# Patient Record
Sex: FEMALE | URBAN - METROPOLITAN AREA
[De-identification: names, ages, dates, MRNs, and addresses within clinical notes are randomized per-mention and may not be internally consistent; named-entity substitution may affect disease eponyms.]

---

## 2021-02-24 DIAGNOSIS — Z23 NEED FOR VACCINATION: ICD-10-CM

## 2021-03-12 ENCOUNTER — IMMUNIZATION (OUTPATIENT)
Dept: FAMILY PLANNING/WOMEN'S HEALTH CLINIC | Facility: IMMUNIZATION CENTER | Age: 81
End: 2021-03-12
Attending: INTERNAL MEDICINE
Payer: COMMERCIAL

## 2021-03-12 DIAGNOSIS — Z23 NEED FOR VACCINATION: ICD-10-CM

## 2021-03-12 DIAGNOSIS — Z23 ENCOUNTER FOR VACCINATION: Primary | ICD-10-CM

## 2021-03-12 PROCEDURE — 91301 MODERNA SARS-COV-2 VACCINE: CPT

## 2021-03-12 PROCEDURE — 0011A MODERNA SARS-COV-2 VACCINE: CPT

## 2021-03-15 ENCOUNTER — NURSE TRIAGE (OUTPATIENT)
Dept: HEALTH INFORMATION MANAGEMENT | Facility: OTHER | Age: 81
End: 2021-03-15

## 2021-03-29 ENCOUNTER — NURSE TRIAGE (OUTPATIENT)
Dept: HEALTH INFORMATION MANAGEMENT | Facility: OTHER | Age: 81
End: 2021-03-29

## 2021-03-29 NOTE — TELEPHONE ENCOUNTER
"Pt concerned about getting second dose of the COVID vaccine.  Pt advised by PCP to take benadryl 30 minutes prior to vaccinations.    Pt calling for validation for her concerns.  Advised to take Benadryl as told by PCP, and to notify vaccine administration site about her adverse reaction to the first dose.         Additional Information  • Negative: Difficulty with breathing or swallowing starts within 2 hours after injection  • Negative: Difficult to awaken or acting confused (e.g., disoriented, slurred speech)  • Negative: Unresponsive, passed out, or very weak  • Negative: Sounds like a life-threatening emergency to the triager  • Negative: Sounds like a severe, unusual reaction to the triager  • Negative: Fever > 103 F (39.4 C)  • Negative: Fever > 101 F (38.3 C) and over 60 years of age  • Negative: Fever > 100.0 F (37.8 C) and has diabetes mellitus or a weak immune system (e.g., HIV positive, cancer chemotherapy, organ transplant, splenectomy, chronic steroids)  • Negative: Fever > 100.0 F (37.8 C) and bedridden (e.g., nursing home patient, stroke, chronic illness, recovering from surgery)  • Negative: Measles vaccine and purple/blood-colored rash (onset day 6-12)  • Negative: Redness or red streak around the injection site begins > 48 hours after shot  • Negative: Fever present > 3 days (72 hours)  • Negative: Smallpox vaccine and eye pain, eye redness, or rash on eyelids  • Negative: Deep lump follows (in 2 to 8 weeks) Td or TDaP shot, and becomes tender to the touch  • Negative: Patient wants to be seen  • Negative: Mild immunization reaction  • Negative: Painless lump at Tetanus-diphtheria (Td) injection site  • Negative: Immunization reactions, questions about    Answer Assessment - Initial Assessment Questions  1. SYMPTOMS: \"What is the main symptom?\" (e.g., redness, swelling, pain)       Dizziness   2. ONSET: \"When was the vaccine (shot) given?\" \"How much later did the immediately begin?\" (e.g., hours, " "days ago)       Swollen eyes/lips happen 1 hour.   3. SEVERITY: \"How bad is it?\"       Bad  4. FEVER: \"Is there a fever?\" If so, ask: \"What is it, how was it measured, and when did it start?\"       No  5. IMMUNIZATIONS GIVEN: \"What shots have you recently received?\"      Covid  6. PAST REACTIONS: \"Have you reacted to immunizations before?\" If so, ask: \"What happened?\"      No  7. OTHER SYMPTOMS: \"Do you have any other symptoms?\"      Not now.  Just concerned about the second dose.    Protocols used: IMMUNIZATION [id number]      "

## 2021-04-17 ENCOUNTER — IMMUNIZATION (OUTPATIENT)
Dept: FAMILY PLANNING/WOMEN'S HEALTH CLINIC | Facility: IMMUNIZATION CENTER | Age: 81
End: 2021-04-17
Attending: INTERNAL MEDICINE
Payer: COMMERCIAL

## 2021-04-17 DIAGNOSIS — Z23 ENCOUNTER FOR VACCINATION: Primary | ICD-10-CM

## 2021-04-17 PROCEDURE — 0012A MODERNA SARS-COV-2 VACCINE: CPT

## 2021-04-17 PROCEDURE — 91301 MODERNA SARS-COV-2 VACCINE: CPT

## 2022-06-01 NOTE — TELEPHONE ENCOUNTER
Clinic hours for Dr. Greenfield:  Monday 8:20am - 4:30pm  Tuesday 8:20am - 4:30pm  Wednesday 8:20am - 4:30pm  Thursday 8:20am - 4:30pm  Friday           Not in    If you need a refill on your prescription, please call your pharmacy and let them know. Please be proactive and call before your medication runs out. The pharmacy will then contact us for the refill. Please allow 24-48 hours for the refill to be processed.     If your Physician/Specialty Provider has ordered additional laboratory or radiology testing to be done before your next scheduled office visit, those results will be discussed with you at that upcoming visit. This will allow you the opportunity to go over the results in person with your provider. If your results require immediate intervention, you will be contacted sooner by phone call.     If your Physician/Specialty Provider has ordered labs for you to be done either today during this office visit, or in between office visits, you may receive any non-urgent test results and recommendations via your [facility]. To retrieve these results and recommendations, please click on your lab result itself to see if any comments have been left for you from your provider. If you do not have a [email or web address], your provider's office will either call you with those results or you may reach out to the office yourself to obtain results.      You may be receiving a patient satisfaction survey in the mail or in your email. If you receive an email survey, please look for the subject line of: \" Your provider name\" would like your feedback\". Please take the time to complete your survey either via the mail or email, as your feedback is very important to us. We strive to make your experience exceptional and your comments help us with that goal. We look forward to hearing from you.    Continue testosterone injections 200 mg every 4 weeks.  Do blood tests today you will be  PCP is Dr. Schaffer.      Vaccinated on 3/12 w/Moderna.  Had an allergic reaction, felt dizzy, rash later, difficulty breathing, went home, 30 minutes later eyes & lips swelling, burning sensation on back, rash started on thighs & lower back.  Son took to ED in [place].       Did EKG in ED.  It was normal.      Never had reaction to vaccine before, but she is drug sensitive.  Never had allergic reaction like this before.      Does not know what to do about second dose.     Advised to reach out to PCP & then make decision.          called with test results and recommendations.  Advise to watch your diet as more weight gain is not advised.  Follow- up in 1 year earlier follow-up if any problems or concerns.